# Patient Record
Sex: MALE | Race: WHITE | NOT HISPANIC OR LATINO | Employment: OTHER | URBAN - METROPOLITAN AREA
[De-identification: names, ages, dates, MRNs, and addresses within clinical notes are randomized per-mention and may not be internally consistent; named-entity substitution may affect disease eponyms.]

---

## 2018-07-12 ENCOUNTER — OFFICE VISIT (OUTPATIENT)
Dept: FAMILY MEDICINE | Facility: CLINIC | Age: 63
End: 2018-07-12
Payer: MEDICARE

## 2018-07-12 ENCOUNTER — HOSPITAL ENCOUNTER (OUTPATIENT)
Dept: RADIOLOGY | Facility: HOSPITAL | Age: 63
Discharge: HOME | End: 2018-07-12
Attending: FAMILY MEDICINE
Payer: MEDICARE

## 2018-07-12 ENCOUNTER — TELEPHONE (OUTPATIENT)
Dept: FAMILY MEDICINE | Facility: CLINIC | Age: 63
End: 2018-07-12

## 2018-07-12 VITALS
HEIGHT: 70 IN | SYSTOLIC BLOOD PRESSURE: 128 MMHG | BODY MASS INDEX: 24.9 KG/M2 | TEMPERATURE: 99 F | WEIGHT: 173.94 LBS | OXYGEN SATURATION: 98 % | HEART RATE: 58 BPM | DIASTOLIC BLOOD PRESSURE: 70 MMHG

## 2018-07-12 DIAGNOSIS — M54.50 ACUTE MIDLINE LOW BACK PAIN WITHOUT SCIATICA: ICD-10-CM

## 2018-07-12 DIAGNOSIS — M54.50 ACUTE MIDLINE LOW BACK PAIN WITHOUT SCIATICA: Primary | ICD-10-CM

## 2018-07-12 PROCEDURE — 72114 X-RAY EXAM L-S SPINE BENDING: CPT | Mod: 26,,, | Performed by: RADIOLOGY

## 2018-07-12 PROCEDURE — 72114 X-RAY EXAM L-S SPINE BENDING: CPT | Mod: TC,FY,PO

## 2018-07-12 PROCEDURE — 99204 OFFICE O/P NEW MOD 45 MIN: CPT | Mod: S$GLB,,, | Performed by: FAMILY MEDICINE

## 2018-07-12 PROCEDURE — 99999 PR PBB SHADOW E&M-NEW PATIENT-LVL IV: CPT | Mod: PBBFAC,,, | Performed by: FAMILY MEDICINE

## 2018-07-12 PROCEDURE — 3008F BODY MASS INDEX DOCD: CPT | Mod: CPTII,S$GLB,, | Performed by: FAMILY MEDICINE

## 2018-07-12 RX ORDER — TIZANIDINE 4 MG/1
4 TABLET ORAL NIGHTLY PRN
Qty: 30 TABLET | Refills: 0 | Status: SHIPPED | OUTPATIENT
Start: 2018-07-12 | End: 2018-07-22

## 2018-07-12 RX ORDER — OMEPRAZOLE 40 MG/1
CAPSULE, DELAYED RELEASE ORAL
Refills: 1 | COMMUNITY
Start: 2018-05-29

## 2018-07-12 RX ORDER — ROSUVASTATIN CALCIUM 40 MG/1
TABLET, COATED ORAL
Refills: 3 | COMMUNITY
Start: 2018-06-29

## 2018-07-12 RX ORDER — ESCITALOPRAM OXALATE 10 MG/1
TABLET ORAL
Refills: 1 | COMMUNITY
Start: 2018-06-19

## 2018-07-12 RX ORDER — IBUPROFEN 600 MG/1
600 TABLET ORAL
Qty: 42 TABLET | Refills: 0 | Status: SHIPPED | OUTPATIENT
Start: 2018-07-12 | End: 2018-07-26

## 2018-07-12 NOTE — PATIENT INSTRUCTIONS
Educational material distributed.  Stretching exercises discussed.  Ice to affected area as needed for local pain relief.  NSAIDs per medication orders.  Muscle relaxants per medication orders. THIS MAY BE SEDATING. PLEASE BE CAREFUL WITH DRIVING UNTIL YOU KNOW HOW THIS AFFECTS YOU  PT referral.  F/U with PCP if not improved      Exercises to Strengthen Your Lower Back  Strong lower back and abdominal muscles work together to support your spine. The exercises below will help strengthen the lower back. It is important that you begin exercising slowly and increase levels gradually.  Always begin any exercise program with stretching. If you feel pain while doing any of these exercises, stop and talk to your doctor about a more specific exercise program that better suits your condition.   Low back stretch  The point of stretching is to make you more flexible and increase your range of motion. Stretch only as much as you are able. Stretch slowly. Do not push your stretch to the limit. If at any point you feel pain while stretching, this is your (temporary) limit.  · Lie on your back with your knees bent and both feet on the ground.  · Slowly raise your left knee to your chest as you flatten your lower back against the floor. Hold for 5 seconds.  · Relax and repeat the exercise with your right knee.  · Do 10 of these exercises for each leg.  · Repeat hugging both knees to your chest at the same time.  Building lower back strength  Start your exercise routine with 10 to 30 minutes a day, 1 to 3 times a day.  Initial exercises  Lying on your back:  1. Ankle pumps: Move your foot up and down, towards your head, and then away. Repeat 10 times with each foot.  2. Heel slides: Slowly bend your knee, drawing the heel of your foot towards you. Then slide your heel/foot from you, straightening your knee. Do not lift your foot off the floor (this is not a leg lift).  3. Abdominal contraction: Bend your knees and put your hands on  your stomach. Tighten your stomach muscles. Hold for 5 seconds, then relax. Repeat 10 times.  4. Straight leg raise: Bend one leg at the knee and keep the other leg straight. Tighten your stomach muscles. Slowly lift your straight leg 6 to 12 inches off the floor and hold for up to 5 seconds. Repeat 10 times on each side.  Standin. Wall squats: Stand with your back against the wall. Move your feet about 12 inches away from the wall. Tighten your stomach muscles, and slowly bend your knees until they are at about a 45 degree angle. Do not go down too far. Hold about 5 seconds. Then slowly return to your starting position. Repeat 10 times.  2. Heel raises: Stand facing the wall. Slowly raise the heels of your feet up and down, while keeping your toes on the floor. If you have trouble balancing, you can touch the wall with your hands. Repeat 10 times.  More advanced exercises  When you feel comfortable enough, try these exercises.  1. Kneeling lumbar extension: Begin on your hands and knees. At the same time, raise and straighten your right arm and left leg until they are parallel to the ground. Hold for 2 seconds and come back slowly to a starting position. Repeat with left arm and right leg, alternating 10 times.  2. Prone lumbar extension: Lie face down, arms extended overhead, palms on the floor. At the same time, raise your right arm and left leg as high as comfortably possible. Hold for 10 seconds and slowly return to start. Repeat with left arm and right leg, alternating 10 times. Gradually build up to 20 times. (Advanced: Repeat this exercise raising both arms and both legs a few inches off the floor at the same time. Hold for 5 seconds and release.)  3. Pelvic tilt: Lie on the floor on your back with your knees bent at 90 degrees. Your feet should be flat on the floor. Inhale, exhale, then slowly contract your abdominal muscles bringing your navel toward your spine. Let your pelvis rock back until your  lower back is flat on the floor. Hold for 10 seconds while breathing smoothly.  4. Abdominal crunch: Perform a pelvic tilt (above) flattening your lower back against the floor. Holding the tension in your abdominal muscles, take another breath and raise your shoulder blades off the ground (this is not a full sit-up). Keep your head in line with your body (dont bend your neck forward). Hold for 2 seconds, then slowly lower.  Date Last Reviewed: 6/1/2016  © 4430-4760 [employer]. [address] [place], [place] [id number]. All rights reserved. This information is not intended as a substitute for professional medical care. Always follow your healthcare professional's instructions.

## 2018-07-12 NOTE — PROGRESS NOTES
Subjective:       Patient ID: Presley Price is a 63 y.o. male.    Chief Complaint: Back Pain (lower x 2 days)    Presley is a 63 y.o. male who presents today for an urgent care visit for back pain. This is acute on chronic. He was playing volleyball yesterday when this got triggered. Exercise helps limit his flares. Movement makes this current pain worse but he can't find a comfortable position.       Back Pain   This is a recurrent (acute on chronic) problem. The current episode started yesterday. The problem occurs daily. The problem is unchanged. The pain is present in the lumbar spine and sacro-iliac. The quality of the pain is described as aching. The pain does not radiate. The pain is at a severity of 8/10. The pain is moderate. The symptoms are aggravated by position. Pertinent negatives include no bladder incontinence, bowel incontinence, dysuria, fever, leg pain, numbness, paresis, paresthesias, pelvic pain, perianal numbness, tingling or weakness. He has tried heat and ice for the symptoms. The treatment provided mild relief.     Review of Systems   Constitutional: Negative for fever.   Gastrointestinal: Negative for bowel incontinence.   Genitourinary: Negative for bladder incontinence, dysuria and pelvic pain.   Musculoskeletal: Positive for back pain.   Neurological: Negative for tingling, weakness, numbness and paresthesias.          Objective:     Vitals:    07/12/18 1103   BP: 128/70   Pulse: (!) 58   Temp: 98.7 °F (37.1 °C)        Physical Exam   Constitutional: He is oriented to person, place, and time. He appears well-developed and well-nourished.   HENT:   Head: Normocephalic and atraumatic.   Eyes: Conjunctivae and EOM are normal. Pupils are equal, round, and reactive to light.   Neck: Neck supple.   Cardiovascular: Normal rate and regular rhythm.    Pulmonary/Chest: Effort normal and breath sounds normal. No respiratory distress. He has no wheezes.   Abdominal: Soft. There is no tenderness.    Musculoskeletal: He exhibits no edema or tenderness.   There is TTP of the right and left lumbar paraspinal muscle  There is no limitation is flexion or extension  There is limitation in rotation  Straight leg test is negative BL   Neurological: He is alert and oriented to person, place, and time. No cranial nerve deficit or sensory deficit. He exhibits normal muscle tone. He displays a negative Romberg sign.   Finger to nose intact  Heel to shin intact  Strength and sensation grossly intract BL UE/LE  CN 2-12 grossly intact  PERRLA  Rapid alternating movement intact   Skin: Skin is warm and dry.   Psychiatric: He has a normal mood and affect. His speech is normal and behavior is normal. Judgment and thought content normal.   Nursing note and vitals reviewed.      Assessment:       1. Acute midline low back pain without sciatica        Plan:         Acute midline low back pain without sciatica  Natural history and expected course discussed. Questions answered.  Educational material distributed.  Stretching exercises discussed.  Ice to affected area as needed for local pain relief.  NSAIDs per medication orders.  Muscle relaxants per medication orders. THIS MAY BE SEDATING. PLEASE BE CAREFUL WITH DRIVING UNTIL YOU KNOW HOW THIS AFFECTS YOU  PT referral.  XR lumbar spine  CMP to assess kidney function prior to course of NSAIDs  F/U with PCP if not improved  -     ibuprofen (ADVIL,MOTRIN) 600 MG tablet; Take 1 tablet (600 mg total) by mouth 3 (three) times daily with meals. for 14 days  Dispense: 42 tablet; Refill: 0  -     tiZANidine (ZANAFLEX) 4 MG tablet; Take 1 tablet (4 mg total) by mouth nightly as needed.  Dispense: 30 tablet; Refill: 0  -     Cancel: Ambulatory Referral to Physical/Occupational Therapy  -     Comprehensive metabolic panel; Future; Expected date: 07/12/2018  -     X-Ray Lumbar Complete With Flex And Ext; Future; Expected date: 07/12/2018  -     Ambulatory Referral to Physical/Occupational  Therapy        Warning signs discussed, patient to call with any further issues or worsening of symptoms.

## 2018-07-12 NOTE — TELEPHONE ENCOUNTER
----- Message from Jose Day DO sent at 7/12/2018  1:44 PM CDT -----  Please call the patient regarding his abnormal result. He has some slippage on his spine from L5 to S1. There is also some disc space height loss at L4/L5. On his L5 spine, he has some changes that appear to be chronic arthritis changes. The first line for all of this is PT, ibuprofen, and ICE. If the symptoms persist, he may benefit from a referral to a spine specialist and he should see his PCP regarding this.

## 2018-07-13 ENCOUNTER — TELEPHONE (OUTPATIENT)
Dept: FAMILY MEDICINE | Facility: CLINIC | Age: 63
End: 2018-07-13

## 2018-07-13 NOTE — TELEPHONE ENCOUNTER
----- Message from Mary Melendez sent at 7/13/2018  9:13 AM CDT -----  Contact: [phone]/self  Patient states he is returning your call. Please advise.

## 2018-09-07 ENCOUNTER — HOSPITAL ENCOUNTER (OUTPATIENT)
Dept: RADIOLOGY | Facility: HOSPITAL | Age: 63
Discharge: HOME | End: 2018-09-07
Attending: FAMILY MEDICINE
Payer: MEDICARE

## 2018-09-07 DIAGNOSIS — M54.9 BACK PAIN: ICD-10-CM

## 2018-09-07 DIAGNOSIS — M54.9 BACK PAIN: Primary | ICD-10-CM

## 2018-09-07 DIAGNOSIS — G54.9 RADICULOMYELOPATHY: ICD-10-CM

## 2018-09-07 PROCEDURE — 72148 MRI LUMBAR SPINE W/O DYE: CPT | Mod: TC

## 2018-09-07 PROCEDURE — 72148 MRI LUMBAR SPINE W/O DYE: CPT | Mod: 26,,, | Performed by: RADIOLOGY

## 2020-12-28 ENCOUNTER — HOSPITAL ENCOUNTER (OUTPATIENT)
Dept: RADIOLOGY | Facility: HOSPITAL | Age: 65
Discharge: HOME | End: 2020-12-28
Attending: ORTHOPAEDIC SURGERY
Payer: MEDICARE

## 2020-12-28 ENCOUNTER — OFFICE VISIT (OUTPATIENT)
Dept: ORTHOPEDICS | Facility: CLINIC | Age: 65
End: 2020-12-28
Payer: MEDICARE

## 2020-12-28 DIAGNOSIS — M20.22 HALLUX RIGIDUS OF BOTH FEET: ICD-10-CM

## 2020-12-28 DIAGNOSIS — R52 PAIN: ICD-10-CM

## 2020-12-28 DIAGNOSIS — M20.21 HALLUX RIGIDUS OF BOTH FEET: ICD-10-CM

## 2020-12-28 DIAGNOSIS — R52 PAIN: Primary | ICD-10-CM

## 2020-12-28 PROCEDURE — 73630 X-RAY EXAM OF FOOT: CPT | Mod: 26,RT,, | Performed by: RADIOLOGY

## 2020-12-28 PROCEDURE — 73630 X-RAY EXAM OF FOOT: CPT | Mod: TC,50

## 2020-12-28 PROCEDURE — 99203 OFFICE O/P NEW LOW 30 MIN: CPT | Mod: S$GLB,,, | Performed by: ORTHOPAEDIC SURGERY

## 2020-12-28 PROCEDURE — 99999 PR PBB SHADOW E&M-EST. PATIENT-LVL I: ICD-10-PCS | Mod: PBBFAC,,, | Performed by: ORTHOPAEDIC SURGERY

## 2020-12-28 PROCEDURE — 73630 PR  X-RAY FOOT 3+ VW: ICD-10-PCS | Mod: 26,59,LT, | Performed by: RADIOLOGY

## 2020-12-28 PROCEDURE — 73630 X-RAY EXAM OF FOOT: CPT | Mod: 26,59,LT, | Performed by: RADIOLOGY

## 2020-12-28 PROCEDURE — 1159F MED LIST DOCD IN RCRD: CPT | Mod: S$GLB,,, | Performed by: ORTHOPAEDIC SURGERY

## 2020-12-28 PROCEDURE — 1159F PR MEDICATION LIST DOCUMENTED IN MEDICAL RECORD: ICD-10-PCS | Mod: S$GLB,,, | Performed by: ORTHOPAEDIC SURGERY

## 2020-12-28 PROCEDURE — 99203 PR OFFICE/OUTPT VISIT, NEW, LEVL III, 30-44 MIN: ICD-10-PCS | Mod: S$GLB,,, | Performed by: ORTHOPAEDIC SURGERY

## 2020-12-28 PROCEDURE — 99999 PR PBB SHADOW E&M-EST. PATIENT-LVL I: CPT | Mod: PBBFAC,,, | Performed by: ORTHOPAEDIC SURGERY

## 2020-12-28 RX ORDER — METHYLPREDNISOLONE 4 MG/1
TABLET ORAL
Qty: 1 PACKAGE | Refills: 0 | Status: SHIPPED | OUTPATIENT
Start: 2020-12-28

## 2020-12-28 NOTE — PROGRESS NOTES
Subjective:      Patient ID: Presley Price is a 65 y.o. male.    Chief Complaint:  Bilateral big toe pain    HPI:  This is a 65-year-old active male who has had previous history of surgery on his right big toe which he believes was for a bunion and reports some continued symptoms of the right big toe but his left big toe bothers him more.  He plays a lot of golf in walks and finds himself walking on the lateral aspect of his foot due to progressive pain in his left big toe.  He comes in for evaluation.    Past Medical History:   Diagnosis Date    GERD (gastroesophageal reflux disease)     Hyperlipidemia        Current Outpatient Medications:     escitalopram oxalate (LEXAPRO) 10 MG tablet, , Disp: , Rfl: 1    omeprazole (PRILOSEC) 40 MG capsule, TK 1 C PO QD, Disp: , Rfl: 1    rosuvastatin (CRESTOR) 40 MG Tab, TK 1 T PO HS, Disp: , Rfl: 3  Review of patient's allergies indicates:  No Known Allergies    There were no vitals taken for this visit.    ROS:  Negative for chest pain, shortness of breath, fevers, or unexplained weight loss.      Objective:    Ortho Exam      This is a well-developed well-nourished 5 ft 10 in, 173 lb male who walks in with a normal gait.  On standing inspection he has plantigrade alignment of his feet.  He has a surgical scar on the dorsum of his right big toe MTP joint with some noticeable prominence.  He has some mild prominence of the left big toe MTP joint.  On sitting exam he has full motion of his ankle subtalar joints bilaterally.  He has normal strength and sensation and good pulses throughout.  The range of motion of both big toes is decreased especially with dorsiflexion.  He does not report any significant pain within the mid range of motion.  He reports pain mainly on the left with plantar flexion.  He does have decreased dorsiflexion bilaterally but this does not cause significant pain.  He is tender bilaterally over the prominences at the MTP joint.      Assessment:      Imaging:  I ordered and reviewed x-rays of both feet today.  The right foot x-ray reveals evidence of previous bunion surgery.  There are 2 screws in the midportion of the 1st metatarsal.  He does have degenerative changes of the right MTP joint with some mild dorsal osteophyte formation.  The x-ray of the left foot reveals some mild degenerative changes of the left big toe MTP joint.  On lateral view the dorsal aspect of his 1st metatarsal head is fairly prominent but there is not a significant osteophyte formation.        1. Pain    2. Hallux rigidus of both feet          Plan:       Orders Placed This Encounter    X-Ray Foot Complete Bilateral     Recommendation:  His symptoms are fairly intermittent and manageable at this point, so he does not want to proceed with any surgical intervention.  He might be a candidate for a cheilectomy of his left big toe.  I suggested that we could try a Medrol Dosepak at this time to see if this reduces his current flare-up of pain.  He has been taking Aleve on a fairly regular basis.    Follow-up as needed